# Patient Record
Sex: MALE | Race: BLACK OR AFRICAN AMERICAN | NOT HISPANIC OR LATINO | URBAN - METROPOLITAN AREA
[De-identification: names, ages, dates, MRNs, and addresses within clinical notes are randomized per-mention and may not be internally consistent; named-entity substitution may affect disease eponyms.]

---

## 2019-03-17 ENCOUNTER — EMERGENCY (EMERGENCY)
Facility: HOSPITAL | Age: 24
LOS: 1 days | Discharge: HOME | End: 2019-03-17
Admitting: EMERGENCY MEDICINE
Payer: MEDICAID

## 2019-03-17 VITALS
HEART RATE: 64 BPM | RESPIRATION RATE: 20 BRPM | TEMPERATURE: 99 F | SYSTOLIC BLOOD PRESSURE: 120 MMHG | DIASTOLIC BLOOD PRESSURE: 82 MMHG | OXYGEN SATURATION: 99 %
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
## 2019-03-17 DIAGNOSIS — T78.1XXA OTHER ADVERSE FOOD REACTIONS, NOT ELSEWHERE CLASSIFIED, INITIAL ENCOUNTER: ICD-10-CM

## 2019-03-17 DIAGNOSIS — R22.0 LOCALIZED SWELLING, MASS AND LUMP, HEAD: ICD-10-CM

## 2019-03-17 DIAGNOSIS — L50.0 ALLERGIC URTICARIA: ICD-10-CM

## 2019-03-17 DIAGNOSIS — T78.40XA ALLERGY, UNSPECIFIED, INITIAL ENCOUNTER: ICD-10-CM

## 2019-03-17 DIAGNOSIS — R21 RASH AND OTHER NONSPECIFIC SKIN ERUPTION: ICD-10-CM

## 2019-03-17 DIAGNOSIS — F17.200 NICOTINE DEPENDENCE, UNSPECIFIED, UNCOMPLICATED: ICD-10-CM
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
## 2019-03-17 DIAGNOSIS — J45.909 UNSPECIFIED ASTHMA, UNCOMPLICATED: ICD-10-CM
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
## 2019-03-17 PROCEDURE — 99284 EMERGENCY DEPT VISIT MOD MDM: CPT

## 2019-03-17 RX ORDER — LORATADINE 10 MG/1
10 TABLET ORAL ONCE
Qty: 0 | Refills: 0 | Status: COMPLETED | OUTPATIENT
Start: 2019-03-17 | End: 2019-03-17

## 2019-03-17 RX ORDER — FAMOTIDINE 10 MG/ML
20 INJECTION INTRAVENOUS ONCE
Qty: 0 | Refills: 0 | Status: COMPLETED | OUTPATIENT
Start: 2019-03-17 | End: 2019-03-17

## 2019-03-17 RX ORDER — DEXAMETHASONE 0.5 MG/5ML
10 ELIXIR ORAL ONCE
Qty: 0 | Refills: 0 | Status: COMPLETED | OUTPATIENT
Start: 2019-03-17 | End: 2019-03-17

## 2019-03-17 RX ORDER — LEVOCETIRIZINE DIHYDROCHLORIDE 0.5 MG/ML
1 SOLUTION ORAL
Qty: 7 | Refills: 0 | OUTPATIENT
Start: 2019-03-17 | End: 2019-03-23

## 2019-03-17 RX ORDER — DIPHENHYDRAMINE HCL 50 MG
2 CAPSULE ORAL
Qty: 20 | Refills: 0 | OUTPATIENT
Start: 2019-03-17

## 2019-03-17 RX ADMIN — LORATADINE 10 MILLIGRAM(S): 10 TABLET ORAL at 10:28

## 2019-03-17 RX ADMIN — Medication 10 MILLIGRAM(S): at 10:28

## 2019-03-17 RX ADMIN — FAMOTIDINE 20 MILLIGRAM(S): 10 INJECTION INTRAVENOUS at 10:28

## 2019-03-17 NOTE — ED ADULT NURSE NOTE - OBJECTIVE STATEMENT
pt is a 24 y/o male c/o hives, eye swelling and itching x 1 day after possibly eating tomatoes. airway patent, speaking in full sentences, even nonlabored breathing. will continue to monitor.

## 2019-03-17 NOTE — ED ADULT TRIAGE NOTE - CHIEF COMPLAINT QUOTE
Patient arrives via EMS from work complaining of an allergic reaction since last night, unknown allergies, but patient states he had tomatoes last night which is new for him; complaining of hives, itching skin, eye swollen itchy; took benadryl last night with some relief, but his boss this morning told him he should come get checked out at the ED

## 2019-03-17 NOTE — ED PROVIDER NOTE - CLINICAL SUMMARY MEDICAL DECISION MAKING FREE TEXT BOX
AFVSS at time of d/c, pt non-toxic appearing, results, ddx, and f/u plans discussed with pt at bedside, d/c'd home to f/u with PMD, strict return precautions discussed, prompt return to ER for any worsening or new sx, pt verbalized understanding. pt p/w urticarial rash and facial swelling s/p ingestion tomatoes and onions for the first time yesterday, well appearing, phonating well, no respiratory involvement, AFVSS and non-toxic appearing, sx consistent with allergic reaction, given dose of antihistamines and steroids, results, ddx, and f/u plans discussed with pt at bedside, d/c'd home to f/u with PMD and allergist, strict return precautions discussed, prompt return to ER for any worsening or new sx, pt verbalized understanding.

## 2019-03-17 NOTE — ED PROVIDER NOTE - CARE PROVIDER_API CALL
Akanksha Ferrara)  Dermatology  [address] [place] [address], [place] [address]  Phone: [phone]  Fax: [phone]  Follow Up Time:

## 2019-03-17 NOTE — ED PROVIDER NOTE - OBJECTIVE STATEMENT
24 yo M 22 yo M with PMHx of seasonal allergy, presenting c/o hives, eye swelling and itching 24 yo M with PMHx of seasonal allergy and asthma, no h/o intubation, baseline peak flow unknown, THC smoker, presenting c/o hives, eye swelling and itching x 1d.  Pt reports having tomatoes and onions for the first time in a burger yesterday and noted breaking out in hives all over his body and limbs. Took benadryl prior to sleep with slight improvement. Woke up this morning with itchy and puffy eyes and slight scratching sensation to the throat.  Denies fever, chills, SOB, CP, palpitations, wheezing, stridors, tongue/lip swelling, focal weakness, HA, dizziness, N/V/D/C, oral/genital lesions, cough, paresthesia, numbness, tingling, malaise, and diaphoresis.  No other new products/meds/food reported.

## 2019-03-17 NOTE — ED PROVIDER NOTE - PHYSICAL EXAMINATION
Vital Signs - nursing notes reviewed and confirmed  Gen - WDWN M, NAD, comfortable and non-toxic appearing, speaking in full sentences   Skin - warm, dry, intact, mild generalized balanchable non tender urticarial rash involving neck, face, and limbs, no crepitus, desquamation of the skin, or oral mucosal involvement   HEENT - AT/NC, PERRL, EOMI, no conjunctival injection, moist oral mucosa, TM intact b/l with good cone of lights, o/p clear with no erythema, edema, or exudate, uvula midline, airway patent, neck supple and NT, FROM  CV - S1S2, R/R/R  Resp - respiration non-labored, CTAB, symmetric bs b/l, no r/r/w  GI - NABS, soft, ND, NT, no rebound or guarding, no CVAT b/l   MS - w/w/p, no c/c/e, calves supple and NT, distal pulses symmetric b/l, brisk cap refills, +SILT  Neuro - AxOx3, no focal neuro deficits, ambulatory without gait disturbance 47y/o male presents for preop eval for scheduled ventral hernia repair on 2/16/17.  Pt with c/o abdominal hernia.  Dx few months ago now with increasing discomfort and increased bulging.

## 2019-03-17 NOTE — ED ADULT NURSE NOTE - CHPI ED NUR SYMPTOMS NEG
no swelling of face, tongue/no difficulty breathing/no difficulty swallowing/no wheezing/no vomiting/no nausea/no shortness of breath

## 2019-03-18 PROBLEM — Z00.00 ENCOUNTER FOR PREVENTIVE HEALTH EXAMINATION: Status: ACTIVE | Noted: 2019-03-18

## 2019-03-19 ENCOUNTER — APPOINTMENT (OUTPATIENT)
Age: 24
End: 2019-03-19